# Patient Record
Sex: MALE | Race: WHITE | URBAN - METROPOLITAN AREA
[De-identification: names, ages, dates, MRNs, and addresses within clinical notes are randomized per-mention and may not be internally consistent; named-entity substitution may affect disease eponyms.]

---

## 2020-02-03 ENCOUNTER — OFFICE VISIT (OUTPATIENT)
Dept: OPHTHALMOLOGY | Facility: CLINIC | Age: 65
End: 2020-02-03

## 2020-02-03 DIAGNOSIS — Z83.518 FAMILY HISTORY OF EYE DISEASE: ICD-10-CM

## 2020-02-03 DIAGNOSIS — H52.4 PRESBYOPIA: ICD-10-CM

## 2020-02-03 DIAGNOSIS — H25.012 CORTICAL AGE-RELATED CATARACT OF LEFT EYE: Primary | ICD-10-CM

## 2020-02-03 DIAGNOSIS — H01.00B BLEPHARITIS OF UPPER AND LOWER EYELIDS OF BOTH EYES, UNSPECIFIED TYPE: ICD-10-CM

## 2020-02-03 DIAGNOSIS — H52.203 HYPEROPIA OF BOTH EYES WITH ASTIGMATISM: ICD-10-CM

## 2020-02-03 DIAGNOSIS — H52.03 HYPEROPIA OF BOTH EYES WITH ASTIGMATISM: ICD-10-CM

## 2020-02-03 DIAGNOSIS — H01.00A BLEPHARITIS OF UPPER AND LOWER EYELIDS OF BOTH EYES, UNSPECIFIED TYPE: ICD-10-CM

## 2020-02-03 PROCEDURE — 92004 COMPRE OPH EXAM NEW PT 1/>: CPT | Performed by: OPHTHALMOLOGY

## 2020-02-03 PROCEDURE — 92015 DETERMINE REFRACTIVE STATE: CPT | Performed by: OPHTHALMOLOGY

## 2020-02-03 NOTE — PROGRESS NOTES
Mikel Suarez is a 60year old male. HPI:     HPI     NP/ 60year old here for a complete exam. Pt states he is unsure of last eye exam. Pt has not noticed any problems with DVA, he wears OTC reading glasses.  Pt does not have any ocular complaints at th Disc Normal Normal    C/D Ratio 0.3 0.3    Macula Normal Normal    Vessels Normal Normal    Periphery Normal Normal            Refraction     Wearing Rx       Sphere Cylinder    Right +2.50 Sphere    Left +2.50 Sphere    Type:  OTC reading only          Ma

## 2020-02-03 NOTE — PATIENT INSTRUCTIONS
Blepharitis of upper and lower eyelids of both eyes  Patient instructed to use lid hygiene  daily. Apply baby shampoo to warm washcloth and scrub eyelids gently with eyes closed, then rinse thoroughly.         Cortical age-related cataract of left eye  Mil

## 2023-03-10 ENCOUNTER — OFFICE VISIT (OUTPATIENT)
Dept: OPHTHALMOLOGY | Facility: CLINIC | Age: 68
End: 2023-03-10

## 2023-03-10 DIAGNOSIS — H52.4 PRESBYOPIA OF BOTH EYES: ICD-10-CM

## 2023-03-10 DIAGNOSIS — H52.03 HYPEROPIA OF BOTH EYES: ICD-10-CM

## 2023-03-10 DIAGNOSIS — Z83.518 FAMILY HISTORY OF EYE DISEASE: ICD-10-CM

## 2023-03-10 DIAGNOSIS — H25.012 CORTICAL AGE-RELATED CATARACT OF LEFT EYE: Primary | ICD-10-CM

## 2023-03-10 PROCEDURE — 92004 COMPRE OPH EXAM NEW PT 1/>: CPT | Performed by: OPHTHALMOLOGY

## 2023-03-10 PROCEDURE — 92015 DETERMINE REFRACTIVE STATE: CPT | Performed by: OPHTHALMOLOGY

## 2023-03-10 NOTE — ASSESSMENT & PLAN NOTE
Mild, no treatment. Showering allowed/Stairs allowed/Walking - Indoors allowed/No heavy lifting/straining/Walking - Outdoors allowed/Follow Instructions Provided by your Surgical Team

## 2023-03-10 NOTE — PATIENT INSTRUCTIONS
Family history of eye disease  Mother has macular degeneration. Hyperopia of both eyes  New glasses. Presbyopia of both eyes  New glasses. Cortical age-related cataract of left eye  Mild, no treatment.

## 2024-03-11 ENCOUNTER — OFFICE VISIT (OUTPATIENT)
Dept: OPHTHALMOLOGY | Facility: CLINIC | Age: 69
End: 2024-03-11
Payer: MEDICARE

## 2024-03-11 DIAGNOSIS — H01.02B SQUAMOUS BLEPHARITIS OF UPPER AND LOWER EYELIDS OF BOTH EYES: ICD-10-CM

## 2024-03-11 DIAGNOSIS — H01.02A SQUAMOUS BLEPHARITIS OF UPPER AND LOWER EYELIDS OF BOTH EYES: ICD-10-CM

## 2024-03-11 DIAGNOSIS — H26.9 CORTICAL CATARACT OF BOTH EYES: Primary | ICD-10-CM

## 2024-03-11 DIAGNOSIS — H52.03 HYPEROPIA OF BOTH EYES: ICD-10-CM

## 2024-03-11 DIAGNOSIS — H52.4 PRESBYOPIA OF BOTH EYES: ICD-10-CM

## 2024-03-11 PROCEDURE — 92014 COMPRE OPH EXAM EST PT 1/>: CPT | Performed by: OPHTHALMOLOGY

## 2024-03-11 PROCEDURE — 92015 DETERMINE REFRACTIVE STATE: CPT | Performed by: OPHTHALMOLOGY

## 2024-03-13 PROBLEM — H26.9 CORTICAL CATARACT OF BOTH EYES: Status: ACTIVE | Noted: 2024-03-13

## 2024-03-13 NOTE — PATIENT INSTRUCTIONS
Cortical cataract of both eyes  Mild, no treatment.    Blepharitis of upper and lower eyelids of both eyes  Patient instructed to use lid hygiene  daily.  Apply baby shampoo or Ocusoft or Avenova to warm washcloth and cleanse eyelids gently with eyes closed, then rinse thoroughly.        Presbyopia of both eyes  New glasses.    Hyperopia of both eyes  New glasses.

## 2024-03-13 NOTE — ASSESSMENT & PLAN NOTE
Patient instructed to use lid hygiene  daily.  Apply baby shampoo or Ocusoft or Avenova to warm washcloth and cleanse eyelids gently with eyes closed, then rinse thoroughly.

## 2024-03-13 NOTE — PROGRESS NOTES
Teofilo Cannon is a 69 year old male.    HPI:     HPI    EP/ 69 year old M here for a complete eye exam. LDE: 3/10/23 for hyperopia with astigmatism and presbyopia in both eyes, and cataract in left eye.   Pt states vision is stable with his glasses.   Last edited by Kirk Cabello OT on 3/11/2024  9:46 AM.        Patient History:  History reviewed. No pertinent past medical history.    Surgical History: Teofilo Cannon has no past surgical history on file.    Family History   Problem Relation Age of Onset    Macular degeneration Mother     Glaucoma Neg        Social History:   Social History     Socioeconomic History    Marital status:    Tobacco Use    Smoking status: Never    Smokeless tobacco: Never       Medications:  No current outpatient medications on file.       Allergies:  No Known Allergies    ROS:     ROS    Positive for: Eyes  Last edited by Janet Mayen MD on 3/13/2024  3:41 PM.          PHYSICAL EXAM:     Base Eye Exam       Visual Acuity (Snellen - Linear)         Right Left    Dist sc 20/30 -1 20/25 -2    Near cc 20/25 20/25              Tonometry (Applanation, 10:11 AM)         Right Left    Pressure 14 13              Pupils         Pupils APD    Right PERRL None    Left PERRL None              Visual Fields (Counting fingers)         Left Right     Full Full              Extraocular Movement         Right Left     Full, Ortho Full, Ortho              Dilation       Both eyes: 1.0% Mydriacyl and 2.5% Boby Synephrine @ 10:11 AM                  Slit Lamp and Fundus Exam       External Exam         Right Left    External Normal Normal              Slit Lamp Exam         Right Left    Lids/Lashes Blepharitis Blepharitis    Conjunctiva/Sclera Normal pinguecula    Cornea Clear Clear    Anterior Chamber Deep and quiet Deep and quiet    Iris Normal Normal    Lens Trace Cortical cataract Trace Cortical cataract    Vitreous Clear Clear              Fundus Exam         Right Left    Disc Normal  Normal    C/D Ratio 0.3 0.3    Macula Normal Normal    Vessels Normal Normal    Periphery Normal Normal                  Refraction       Wearing Rx         Sphere Cylinder Add    Right +2.50 Sphere     Left +2.50 Sphere       Type: OTC reading only              Wearing Rx #2         Sphere Cylinder Add    Right +1.25 Sphere +2.25    Left +1.00 Sphere +2.25      Type: forgot glasses-Progressive bifocal              Manifest Refraction         Sphere Cylinder Dist VA Add    Right +1.25 Sphere 20/20 +2.25    Left +1.25 Sphere 20/20 +2.25              Final Rx         Sphere Cylinder Dist VA Add    Right +1.25 Sphere 20/20 +2.25    Left +1.25 Sphere 20/20 +2.25      Type: Progressive bifocal              Final Rx #2         Sphere Cylinder Dist VA Add    Right +1.25 Sphere 20/20     Left +1.25 Sphere 20/20       Type: Distance only                     ASSESSMENT/PLAN:     Diagnoses and Plan:     Cortical cataract of both eyes  Mild, no treatment.    Blepharitis of upper and lower eyelids of both eyes  Patient instructed to use lid hygiene  daily.  Apply baby shampoo or Ocusoft or Avenova to warm washcloth and cleanse eyelids gently with eyes closed, then rinse thoroughly.        Presbyopia of both eyes  New glasses.    Hyperopia of both eyes  New glasses.    No orders of the defined types were placed in this encounter.      Meds This Visit:  Requested Prescriptions      No prescriptions requested or ordered in this encounter        Follow up instructions:  Return in about 1 year (around 3/11/2025), or if symptoms worsen or fail to improve, for Complete exam.    3/13/2024  Scribed by: Janet Mayen MD